# Patient Record
Sex: MALE | Race: WHITE | Employment: FULL TIME | URBAN - METROPOLITAN AREA
[De-identification: names, ages, dates, MRNs, and addresses within clinical notes are randomized per-mention and may not be internally consistent; named-entity substitution may affect disease eponyms.]

---

## 2018-11-17 ENCOUNTER — HOSPITAL ENCOUNTER (INPATIENT)
Age: 28
LOS: 2 days | Discharge: HOME | DRG: 369 | End: 2018-11-19
Attending: STUDENT IN AN ORGANIZED HEALTH CARE EDUCATION/TRAINING PROGRAM | Admitting: INTERNAL MEDICINE
Payer: COMMERCIAL

## 2018-11-17 ENCOUNTER — APPOINTMENT (OUTPATIENT)
Dept: CT IMAGING | Age: 28
DRG: 369 | End: 2018-11-17
Attending: EMERGENCY MEDICINE
Payer: COMMERCIAL

## 2018-11-17 DIAGNOSIS — K92.0 GASTROINTESTINAL HEMORRHAGE WITH HEMATEMESIS: Primary | ICD-10-CM

## 2018-11-17 PROBLEM — D72.829 LEUKOCYTOSIS: Status: ACTIVE | Noted: 2018-11-17

## 2018-11-17 PROBLEM — F10.10 ETOH ABUSE: Status: ACTIVE | Noted: 2018-11-17

## 2018-11-17 PROBLEM — K92.2 ACUTE GI BLEEDING: Status: ACTIVE | Noted: 2018-11-17

## 2018-11-17 PROBLEM — E87.20 METABOLIC ACIDOSIS: Status: ACTIVE | Noted: 2018-11-17

## 2018-11-17 LAB
ABO + RH BLD: NORMAL
ALBUMIN SERPL-MCNC: 3.8 G/DL (ref 3.5–5)
ALBUMIN/GLOB SERPL: 1.1 {RATIO} (ref 1.1–2.2)
ALP SERPL-CCNC: 51 U/L (ref 45–117)
ALT SERPL-CCNC: 36 U/L (ref 12–78)
ANION GAP SERPL CALC-SCNC: 11 MMOL/L (ref 5–15)
AST SERPL-CCNC: 21 U/L (ref 15–37)
BASOPHILS # BLD: 0.1 K/UL (ref 0–0.1)
BASOPHILS NFR BLD: 0 % (ref 0–1)
BILIRUB SERPL-MCNC: 0.4 MG/DL (ref 0.2–1)
BLOOD GROUP ANTIBODIES SERPL: NORMAL
BUN SERPL-MCNC: 39 MG/DL (ref 6–20)
BUN/CREAT SERPL: 38 (ref 12–20)
CALCIUM SERPL-MCNC: 8.9 MG/DL (ref 8.5–10.1)
CHLORIDE SERPL-SCNC: 101 MMOL/L (ref 97–108)
CO2 SERPL-SCNC: 28 MMOL/L (ref 21–32)
CREAT SERPL-MCNC: 1.03 MG/DL (ref 0.7–1.3)
DIFFERENTIAL METHOD BLD: ABNORMAL
EOSINOPHIL # BLD: 0.2 K/UL (ref 0–0.4)
EOSINOPHIL NFR BLD: 1 % (ref 0–7)
ERYTHROCYTE [DISTWIDTH] IN BLOOD BY AUTOMATED COUNT: 12.5 % (ref 11.5–14.5)
ERYTHROCYTE [DISTWIDTH] IN BLOOD BY AUTOMATED COUNT: 12.7 % (ref 11.5–14.5)
ETHANOL SERPL-MCNC: <10 MG/DL
GLOBULIN SER CALC-MCNC: 3.6 G/DL (ref 2–4)
GLUCOSE SERPL-MCNC: 121 MG/DL (ref 65–100)
HCT VFR BLD AUTO: 36.7 % (ref 36.6–50.3)
HCT VFR BLD AUTO: 41.4 % (ref 36.6–50.3)
HGB BLD-MCNC: 12.1 G/DL (ref 12.1–17)
HGB BLD-MCNC: 13.9 G/DL (ref 12.1–17)
IMM GRANULOCYTES # BLD: 0.2 K/UL (ref 0–0.04)
IMM GRANULOCYTES NFR BLD AUTO: 1 % (ref 0–0.5)
INR PPP: 1.1 (ref 0.9–1.1)
LACTATE SERPL-SCNC: 2.2 MMOL/L (ref 0.4–2)
LIPASE SERPL-CCNC: 84 U/L (ref 73–393)
LYMPHOCYTES # BLD: 2.9 K/UL (ref 0.8–3.5)
LYMPHOCYTES NFR BLD: 16 % (ref 12–49)
MAGNESIUM SERPL-MCNC: 1.8 MG/DL (ref 1.6–2.4)
MCH RBC QN AUTO: 28.8 PG (ref 26–34)
MCH RBC QN AUTO: 29 PG (ref 26–34)
MCHC RBC AUTO-ENTMCNC: 33 G/DL (ref 30–36.5)
MCHC RBC AUTO-ENTMCNC: 33.6 G/DL (ref 30–36.5)
MCV RBC AUTO: 86.4 FL (ref 80–99)
MCV RBC AUTO: 87.4 FL (ref 80–99)
MONOCYTES # BLD: 1.3 K/UL (ref 0–1)
MONOCYTES NFR BLD: 7 % (ref 5–13)
NEUTS SEG # BLD: 14 K/UL (ref 1.8–8)
NEUTS SEG NFR BLD: 75 % (ref 32–75)
NRBC # BLD: 0 K/UL (ref 0–0.01)
NRBC # BLD: 0 K/UL (ref 0–0.01)
NRBC BLD-RTO: 0 PER 100 WBC
NRBC BLD-RTO: 0 PER 100 WBC
PLATELET # BLD AUTO: 241 K/UL (ref 150–400)
PLATELET # BLD AUTO: 275 K/UL (ref 150–400)
PMV BLD AUTO: 10.7 FL (ref 8.9–12.9)
PMV BLD AUTO: 10.7 FL (ref 8.9–12.9)
POTASSIUM SERPL-SCNC: 3.9 MMOL/L (ref 3.5–5.1)
PROT SERPL-MCNC: 7.4 G/DL (ref 6.4–8.2)
PROTHROMBIN TIME: 10.9 SEC (ref 9–11.1)
RBC # BLD AUTO: 4.2 M/UL (ref 4.1–5.7)
RBC # BLD AUTO: 4.79 M/UL (ref 4.1–5.7)
SODIUM SERPL-SCNC: 140 MMOL/L (ref 136–145)
SPECIMEN EXP DATE BLD: NORMAL
WBC # BLD AUTO: 18.7 K/UL (ref 4.1–11.1)
WBC # BLD AUTO: 20 K/UL (ref 4.1–11.1)

## 2018-11-17 PROCEDURE — 85025 COMPLETE CBC W/AUTO DIFF WBC: CPT

## 2018-11-17 PROCEDURE — 74011000258 HC RX REV CODE- 258: Performed by: INTERNAL MEDICINE

## 2018-11-17 PROCEDURE — 85610 PROTHROMBIN TIME: CPT

## 2018-11-17 PROCEDURE — 74011250636 HC RX REV CODE- 250/636: Performed by: EMERGENCY MEDICINE

## 2018-11-17 PROCEDURE — C9113 INJ PANTOPRAZOLE SODIUM, VIA: HCPCS | Performed by: INTERNAL MEDICINE

## 2018-11-17 PROCEDURE — 96374 THER/PROPH/DIAG INJ IV PUSH: CPT

## 2018-11-17 PROCEDURE — 74177 CT ABD & PELVIS W/CONTRAST: CPT

## 2018-11-17 PROCEDURE — 65270000029 HC RM PRIVATE

## 2018-11-17 PROCEDURE — 93005 ELECTROCARDIOGRAM TRACING: CPT

## 2018-11-17 PROCEDURE — 85027 COMPLETE CBC AUTOMATED: CPT

## 2018-11-17 PROCEDURE — 83735 ASSAY OF MAGNESIUM: CPT

## 2018-11-17 PROCEDURE — 36415 COLL VENOUS BLD VENIPUNCTURE: CPT

## 2018-11-17 PROCEDURE — 99284 EMERGENCY DEPT VISIT MOD MDM: CPT

## 2018-11-17 PROCEDURE — 83605 ASSAY OF LACTIC ACID: CPT

## 2018-11-17 PROCEDURE — 80053 COMPREHEN METABOLIC PANEL: CPT

## 2018-11-17 PROCEDURE — 74011636320 HC RX REV CODE- 636/320: Performed by: RADIOLOGY

## 2018-11-17 PROCEDURE — 83690 ASSAY OF LIPASE: CPT

## 2018-11-17 PROCEDURE — 96375 TX/PRO/DX INJ NEW DRUG ADDON: CPT

## 2018-11-17 PROCEDURE — C9113 INJ PANTOPRAZOLE SODIUM, VIA: HCPCS | Performed by: EMERGENCY MEDICINE

## 2018-11-17 PROCEDURE — 94762 N-INVAS EAR/PLS OXIMTRY CONT: CPT

## 2018-11-17 PROCEDURE — 74011250636 HC RX REV CODE- 250/636: Performed by: INTERNAL MEDICINE

## 2018-11-17 PROCEDURE — 86901 BLOOD TYPING SEROLOGIC RH(D): CPT

## 2018-11-17 PROCEDURE — 80307 DRUG TEST PRSMV CHEM ANLYZR: CPT

## 2018-11-17 PROCEDURE — 74011000250 HC RX REV CODE- 250: Performed by: INTERNAL MEDICINE

## 2018-11-17 PROCEDURE — 96361 HYDRATE IV INFUSION ADD-ON: CPT

## 2018-11-17 RX ORDER — SODIUM CHLORIDE 0.9 % (FLUSH) 0.9 %
5-10 SYRINGE (ML) INJECTION AS NEEDED
Status: DISCONTINUED | OUTPATIENT
Start: 2018-11-17 | End: 2018-11-19 | Stop reason: HOSPADM

## 2018-11-17 RX ORDER — ACETAMINOPHEN 325 MG/1
650 TABLET ORAL
Status: DISCONTINUED | OUTPATIENT
Start: 2018-11-17 | End: 2018-11-19 | Stop reason: HOSPADM

## 2018-11-17 RX ORDER — SODIUM CHLORIDE 0.9 % (FLUSH) 0.9 %
5-10 SYRINGE (ML) INJECTION EVERY 8 HOURS
Status: DISCONTINUED | OUTPATIENT
Start: 2018-11-17 | End: 2018-11-19 | Stop reason: HOSPADM

## 2018-11-17 RX ORDER — ONDANSETRON 2 MG/ML
4 INJECTION INTRAMUSCULAR; INTRAVENOUS
Status: DISCONTINUED | OUTPATIENT
Start: 2018-11-17 | End: 2018-11-19 | Stop reason: HOSPADM

## 2018-11-17 RX ORDER — HYDROMORPHONE HYDROCHLORIDE 2 MG/ML
0.5 INJECTION, SOLUTION INTRAMUSCULAR; INTRAVENOUS; SUBCUTANEOUS
Status: DISCONTINUED | OUTPATIENT
Start: 2018-11-17 | End: 2018-11-18

## 2018-11-17 RX ORDER — NALOXONE HYDROCHLORIDE 0.4 MG/ML
0.4 INJECTION, SOLUTION INTRAMUSCULAR; INTRAVENOUS; SUBCUTANEOUS AS NEEDED
Status: DISCONTINUED | OUTPATIENT
Start: 2018-11-17 | End: 2018-11-19 | Stop reason: HOSPADM

## 2018-11-17 RX ORDER — ONDANSETRON 2 MG/ML
4 INJECTION INTRAMUSCULAR; INTRAVENOUS
Status: COMPLETED | OUTPATIENT
Start: 2018-11-17 | End: 2018-11-17

## 2018-11-17 RX ORDER — PANTOPRAZOLE SODIUM 40 MG/10ML
40 INJECTION, POWDER, LYOPHILIZED, FOR SOLUTION INTRAVENOUS
Status: COMPLETED | OUTPATIENT
Start: 2018-11-17 | End: 2018-11-17

## 2018-11-17 RX ORDER — DIPHENHYDRAMINE HYDROCHLORIDE 50 MG/ML
12.5 INJECTION, SOLUTION INTRAMUSCULAR; INTRAVENOUS
Status: DISCONTINUED | OUTPATIENT
Start: 2018-11-17 | End: 2018-11-19 | Stop reason: HOSPADM

## 2018-11-17 RX ORDER — SODIUM CHLORIDE 9 MG/ML
100 INJECTION, SOLUTION INTRAVENOUS CONTINUOUS
Status: DISCONTINUED | OUTPATIENT
Start: 2018-11-17 | End: 2018-11-19 | Stop reason: HOSPADM

## 2018-11-17 RX ORDER — DOCUSATE SODIUM 100 MG/1
100 CAPSULE, LIQUID FILLED ORAL 2 TIMES DAILY
Status: DISCONTINUED | OUTPATIENT
Start: 2018-11-17 | End: 2018-11-19 | Stop reason: HOSPADM

## 2018-11-17 RX ORDER — LORAZEPAM 2 MG/ML
2 INJECTION INTRAMUSCULAR
Status: DISCONTINUED | OUTPATIENT
Start: 2018-11-17 | End: 2018-11-19 | Stop reason: HOSPADM

## 2018-11-17 RX ORDER — LORAZEPAM 2 MG/ML
4 INJECTION INTRAMUSCULAR
Status: DISCONTINUED | OUTPATIENT
Start: 2018-11-17 | End: 2018-11-19 | Stop reason: HOSPADM

## 2018-11-17 RX ADMIN — ONDANSETRON 4 MG: 2 INJECTION INTRAMUSCULAR; INTRAVENOUS at 23:07

## 2018-11-17 RX ADMIN — IOPAMIDOL 100 ML: 755 INJECTION, SOLUTION INTRAVENOUS at 18:44

## 2018-11-17 RX ADMIN — ONDANSETRON 4 MG: 2 INJECTION INTRAMUSCULAR; INTRAVENOUS at 18:02

## 2018-11-17 RX ADMIN — FOLIC ACID: 5 INJECTION, SOLUTION INTRAMUSCULAR; INTRAVENOUS; SUBCUTANEOUS at 19:58

## 2018-11-17 RX ADMIN — Medication 10 ML: at 22:00

## 2018-11-17 RX ADMIN — PANTOPRAZOLE SODIUM 40 MG: 40 INJECTION, POWDER, FOR SOLUTION INTRAVENOUS at 18:02

## 2018-11-17 RX ADMIN — SODIUM CHLORIDE 8 MG/HR: 900 INJECTION, SOLUTION INTRAVENOUS at 23:08

## 2018-11-17 RX ADMIN — SODIUM CHLORIDE 8 MG/HR: 900 INJECTION, SOLUTION INTRAVENOUS at 19:25

## 2018-11-17 RX ADMIN — SODIUM CHLORIDE 1000 ML: 900 INJECTION, SOLUTION INTRAVENOUS at 18:02

## 2018-11-17 NOTE — ED PROVIDER NOTES
I have evaluated the patient as the Provider in Triage. I have reviewed His vital signs and the triage nurse assessment. I have talked with the patient and any available family and advised that I am the provider in triage and have ordered the appropriate study to initiate their work up based on the clinical presentation during my assessment. I have advised that the patient will be accommodated in the Main ED as soon as possible. I have also requested to contact the triage nurse or myself immediately if the patient experiences any changes in their condition during this brief waiting period. 34 y.o. male presents with stefanie hematemesis, mixed with coffee grounds, with BRBPR. Started this AM. Heavy ETOH use last night. No hx of prior GI bleeds, or ulcers. Had syncope PTA.  
5:31 PM 
Bianka Nagel,  
 
 
34 y.o. male with no significant past medical history who presents from home with chief complaint of bloody stool. Pt reports he initially vomited 13-14 hours ago which contained a \"scant\" amount of blood. Per Pt's mother, Pt has had several BM's which have been \"very bloody\" and are very dark in color. He also c/o 6/10 epigastric abdominal pain through the day today. Per mother, Pt became confused and had a syncopal episode after passing his last BM PTA. Pt notes he has never had similar sx nor been evaluated by GI in the past. Pt's mother states Pt appears pale. Pt denies known medical problems or regular medications. Pt denies prior hx of blood transfusion. NKDA. There are no other acute medical concerns at this time. Social hx: smokes 1/4 - 1 pack per day, drinks 6-8 beers per day, uses marijuana Note written by Yoel Palomino, as dictated by James Nuñez MD 5:59 PM 
 
 
 
  
 
 
No past medical history on file. No past surgical history on file. No family history on file. Social History Socioeconomic History  Marital status:  Spouse name: Not on file  Number of children: Not on file  Years of education: Not on file  Highest education level: Not on file Social Needs  Financial resource strain: Not on file  Food insecurity - worry: Not on file  Food insecurity - inability: Not on file  Transportation needs - medical: Not on file  Transportation needs - non-medical: Not on file Occupational History  Not on file Tobacco Use  Smoking status: Never Smoker Substance and Sexual Activity  Alcohol use: No  
 Drug use: No  
 Sexual activity: Not Currently Other Topics Concern  Not on file Social History Narrative  Not on file ALLERGIES: Patient has no known allergies. Review of Systems Constitutional: Negative for chills, diaphoresis, fatigue and fever. HENT: Negative for congestion, rhinorrhea, sinus pressure, sore throat, trouble swallowing and voice change. Eyes: Negative for photophobia and visual disturbance. Respiratory: Negative for cough, chest tightness and shortness of breath. Cardiovascular: Negative for chest pain, palpitations and leg swelling. Gastrointestinal: Positive for abdominal pain, blood in stool, nausea and vomiting. Negative for constipation and diarrhea. Musculoskeletal: Negative for arthralgias, myalgias and neck pain. Neurological: Positive for syncope. Negative for dizziness, weakness, light-headedness, numbness and headaches. All other systems reviewed and are negative. There were no vitals filed for this visit. Physical Exam  
Constitutional: He is oriented to person, place, and time. He appears well-developed and well-nourished. No distress. HENT:  
Head: Normocephalic and atraumatic. Nose: Nose normal.  
Mouth/Throat: Oropharynx is clear and moist. No oropharyngeal exudate. Eyes: Conjunctivae and EOM are normal. Right eye exhibits no discharge. Left eye exhibits no discharge. No scleral icterus. Neck: Normal range of motion. Neck supple. No JVD present. No tracheal deviation present. No thyromegaly present. Cardiovascular: Normal rate, regular rhythm, normal heart sounds and intact distal pulses. Exam reveals no gallop and no friction rub. No murmur heard. Pulmonary/Chest: Effort normal and breath sounds normal. No stridor. No respiratory distress. He has no wheezes. He has no rales. He exhibits no tenderness. Abdominal: Bowel sounds are normal. He exhibits no distension and no mass. There is tenderness. There is no rebound. Mild epigastric tenderness. Musculoskeletal: Normal range of motion. He exhibits no edema or tenderness. Lymphadenopathy:  
  He has no cervical adenopathy. Neurological: He is alert and oriented to person, place, and time. No cranial nerve deficit. Coordination normal.  
Skin: Skin is warm and dry. No rash noted. He is not diaphoretic. No erythema. There is pallor. Psychiatric: He has a normal mood and affect. His behavior is normal. Judgment and thought content normal.  
Nursing note and vitals reviewed. Note written by Yoel Liao, as dictated by Nato Griffin MD 5:59 PM 
 
MDM Number of Diagnoses or Management Options Gastrointestinal hemorrhage with hematemesis:  
Diagnosis management comments: A/P:  GIB likely 2/2 ETOH use/abuse. Yolanda-Rich tear also on differential.  Pt currently stable. Cbc, cmp, lactate, type and screen, protonix 40 mg IV, CT abd/pelvis. Reassessment:  Pt to be admitted discussed with GI and Hopsitalist 
 
  
Amount and/or Complexity of Data Reviewed Clinical lab tests: ordered and reviewed Tests in the radiology section of CPT®: reviewed and ordered Review and summarize past medical records: yes Discuss the patient with other providers: yes Independent visualization of images, tracings, or specimens: yes Risk of Complications, Morbidity, and/or Mortality Presenting problems: high Diagnostic procedures: moderate Management options: moderate Critical Care Total time providing critical care: 30-74 minutes (Total critical care time spent exclusive of procedures:  35 min.) Patient Progress Patient progress: stable Procedures CONSULT NOTE: 
7:07 PM Jonathan Tolbert MD spoke with Dr. Jaylen Jonas, Consult for Hospitalist.  Discussed available diagnostic tests and clinical findings. Dr. Jaylen Jonas will admit Pt. CONSULT NOTE: 
7:12 PM Jonathan Tolbert MD spoke with Dr. Rob Nguyen, Consult for Gastroenterology. Discussed available diagnostic tests and clinical findings. Dr. Rob Nguyen recommends admitting Pt and keeping him NPO. 
 
11:20 AM 
Patient is being admitted to the hospital.  The results of their tests and reasons for their admission have been discussed with them and/or available family. They convey agreement and understanding for the need to be admitted and for their admission diagnosis. Consultation has been made with the inpatient physician specialist for hospitalization. LABORATORY TESTS: 
Recent Results (from the past 12 hour(s)) LACTIC ACID Collection Time: 11/18/18  3:53 AM  
Result Value Ref Range Lactic acid 0.8 0.4 - 2.0 MMOL/L  
METABOLIC PANEL, BASIC Collection Time: 11/18/18  3:53 AM  
Result Value Ref Range Sodium 142 136 - 145 mmol/L Potassium 3.7 3.5 - 5.1 mmol/L Chloride 105 97 - 108 mmol/L  
 CO2 30 21 - 32 mmol/L Anion gap 7 5 - 15 mmol/L Glucose 118 (H) 65 - 100 mg/dL BUN 34 (H) 6 - 20 MG/DL Creatinine 0.85 0.70 - 1.30 MG/DL  
 BUN/Creatinine ratio 40 (H) 12 - 20 GFR est AA >60 >60 ml/min/1.73m2 GFR est non-AA >60 >60 ml/min/1.73m2 Calcium 8.1 (L) 8.5 - 10.1 MG/DL  
CBC W/O DIFF Collection Time: 11/18/18  3:53 AM  
Result Value Ref Range WBC 12.6 (H) 4.1 - 11.1 K/uL  
 RBC 3.87 (L) 4.10 - 5.70 M/uL  
 HGB 11.2 (L) 12.1 - 17.0 g/dL HCT 33.5 (L) 36.6 - 50.3 %  MCV 86.6 80.0 - 99.0 FL  
 MCH 28.9 26.0 - 34.0 PG  
 MCHC 33.4 30.0 - 36.5 g/dL  
 RDW 12.7 11.5 - 14.5 % PLATELET 723 642 - 765 K/uL MPV 10.7 8.9 - 12.9 FL  
 NRBC 0.0 0  WBC ABSOLUTE NRBC 0.00 0.00 - 0.01 K/uL HEPATIC FUNCTION PANEL Collection Time: 11/18/18  3:53 AM  
Result Value Ref Range Protein, total 6.4 6.4 - 8.2 g/dL Albumin 3.3 (L) 3.5 - 5.0 g/dL Globulin 3.1 2.0 - 4.0 g/dL A-G Ratio 1.1 1.1 - 2.2 Bilirubin, total 0.4 0.2 - 1.0 MG/DL Bilirubin, direct 0.1 0.0 - 0.2 MG/DL Alk. phosphatase 44 (L) 45 - 117 U/L  
 AST (SGOT) 16 15 - 37 U/L  
 ALT (SGPT) 30 12 - 78 U/L  
MAGNESIUM Collection Time: 11/18/18  3:53 AM  
Result Value Ref Range Magnesium 1.9 1.6 - 2.4 mg/dL PHOSPHORUS Collection Time: 11/18/18  3:53 AM  
Result Value Ref Range Phosphorus 3.3 2.6 - 4.7 MG/DL IMAGING RESULTS: 
CT ABD PELV W CONT Final Result Ct Abd Pelv W Cont Result Date: 11/17/2018 EXAM:  CT ABDOMEN PELVIS WITH CONTRAST INDICATION:  epigastric abd pain, hematemesis. Additional history: COMPARISON: None. . TECHNIQUE: Multislice helical CT was performed from the diaphragm to the symphysis pubis with oral and intravenous contrast administration. Contiguous 5 mm axial images were reconstructed and lung and soft tissue windows were generated. Coronal and sagittal reformations were generated. CT dose reduction was achieved through use of a standardized protocol tailored for this examination and automatic exposure control for dose modulation. Dejan Velez FINDINGS: INCIDENTALLY IMAGED CHEST: Heart/vessels: Within normal limits. Lungs/Pleura: Within normal limits. . ABDOMEN: Liver: Within normal limits. Gallbladder/Biliary: Within normal limits. Spleen: Within normal limits. Pancreas: Within normal limits. Adrenals: Within normal limits. Kidneys: Within normal limits. Peritoneum/Mesenteries: Within normal limits. Extraperitoneum: Within normal limits.  Gastrointestinal tract: Small hiatal hernia with minimal mural thickening in the distal esophagus. Normal-appearing appendix Vascular: Within normal limits. [facility] PELVIS: Extraperitoneum: Within normal limits. Ureters: Within normal limits. Bladder: Within normal limits. Reproductive System: Within normal limits. . MSK: Within normal limits. [facility] IMPRESSION: 1. Small hiatal hernia with mural thickening in the distal esophagus. Recommend clinical correlation. MEDICATIONS GIVEN: 
Medications  
pantoprazole (PROTONIX) 40 mg in 0.9% sodium chloride (MBP/ADV) 50 mL (8 mg/hr IntraVENous New Bag 11/18/18 0945) LORazepam (ATIVAN) injection 2 mg (not administered) LORazepam (ATIVAN) injection 4 mg (not administered) dextrose 5 % - 0.45% NaCl 8,938 mL with folic acid 1 mg, thiamine 100 mg, mvi, adult no. 4 with vit K 10 mL infusion ( IntraVENous Given 11/17/18 1958)  
0.9% sodium chloride infusion (50 mL/hr IntraVENous New Bag 11/18/18 0358)  
sodium chloride (NS) flush 5-10 mL (10 mL IntraVENous Given 11/17/18 2200)  
sodium chloride (NS) flush 5-10 mL (not administered)  
acetaminophen (TYLENOL) tablet 650 mg (not administered)  
naloxone (NARCAN) injection 0.4 mg (not administered) diphenhydrAMINE (BENADRYL) injection 12.5 mg (not administered)  
ondansetron (ZOFRAN) injection 4 mg (4 mg IntraVENous Given 11/17/18 2307) docusate sodium (COLACE) capsule 100 mg (0 mg Oral Held 11/18/18 0900) ondansetron [facility]) injection 4 mg (4 mg IntraVENous Given 11/17/18 1802) pantoprazole (PROTONIX) injection 40 mg (40 mg IntraVENous Given 11/17/18 1802) sodium chloride 0.9 % bolus infusion 1,000 mL (0 mL IntraVENous IV Completed 11/17/18 1924) iopamidol (ISOVUE-370) 76 % injection 100 mL (100 mL IntraVENous Given 11/17/18 1844) IMPRESSION: 
1. Gastrointestinal hemorrhage with hematemesis PLAN: 
1. Admit to Hospitalist 
 
Total critical care time spent exclusive of procedures:  35 minutes Xi Romano MD

## 2018-11-17 NOTE — ED TRIAGE NOTES
Vomited twice started 04:30 am and then went to work vomited again this evening. Passing blood in stools. +ETOH abuse.

## 2018-11-18 LAB
ALBUMIN SERPL-MCNC: 3.3 G/DL (ref 3.5–5)
ALBUMIN/GLOB SERPL: 1.1 {RATIO} (ref 1.1–2.2)
ALP SERPL-CCNC: 44 U/L (ref 45–117)
ALT SERPL-CCNC: 30 U/L (ref 12–78)
ANION GAP SERPL CALC-SCNC: 7 MMOL/L (ref 5–15)
AST SERPL-CCNC: 16 U/L (ref 15–37)
ATRIAL RATE: 90 BPM
BILIRUB DIRECT SERPL-MCNC: 0.1 MG/DL (ref 0–0.2)
BILIRUB SERPL-MCNC: 0.4 MG/DL (ref 0.2–1)
BUN SERPL-MCNC: 34 MG/DL (ref 6–20)
BUN/CREAT SERPL: 40 (ref 12–20)
CALCIUM SERPL-MCNC: 8.1 MG/DL (ref 8.5–10.1)
CALCULATED P AXIS, ECG09: 63 DEGREES
CALCULATED R AXIS, ECG10: 2 DEGREES
CALCULATED T AXIS, ECG11: 5 DEGREES
CHLORIDE SERPL-SCNC: 105 MMOL/L (ref 97–108)
CO2 SERPL-SCNC: 30 MMOL/L (ref 21–32)
CREAT SERPL-MCNC: 0.85 MG/DL (ref 0.7–1.3)
DIAGNOSIS, 93000: NORMAL
ERYTHROCYTE [DISTWIDTH] IN BLOOD BY AUTOMATED COUNT: 12.7 % (ref 11.5–14.5)
GLOBULIN SER CALC-MCNC: 3.1 G/DL (ref 2–4)
GLUCOSE SERPL-MCNC: 118 MG/DL (ref 65–100)
HCT VFR BLD AUTO: 31.1 % (ref 36.6–50.3)
HCT VFR BLD AUTO: 33.5 % (ref 36.6–50.3)
HCT VFR BLD AUTO: 33.5 % (ref 36.6–50.3)
HGB BLD-MCNC: 10.5 G/DL (ref 12.1–17)
HGB BLD-MCNC: 11.2 G/DL (ref 12.1–17)
HGB BLD-MCNC: 11.3 G/DL (ref 12.1–17)
LACTATE SERPL-SCNC: 0.8 MMOL/L (ref 0.4–2)
MAGNESIUM SERPL-MCNC: 1.9 MG/DL (ref 1.6–2.4)
MCH RBC QN AUTO: 28.9 PG (ref 26–34)
MCHC RBC AUTO-ENTMCNC: 33.4 G/DL (ref 30–36.5)
MCV RBC AUTO: 86.6 FL (ref 80–99)
NRBC # BLD: 0 K/UL (ref 0–0.01)
NRBC BLD-RTO: 0 PER 100 WBC
P-R INTERVAL, ECG05: 146 MS
PHOSPHATE SERPL-MCNC: 3.3 MG/DL (ref 2.6–4.7)
PLATELET # BLD AUTO: 213 K/UL (ref 150–400)
PMV BLD AUTO: 10.7 FL (ref 8.9–12.9)
POTASSIUM SERPL-SCNC: 3.7 MMOL/L (ref 3.5–5.1)
PROT SERPL-MCNC: 6.4 G/DL (ref 6.4–8.2)
Q-T INTERVAL, ECG07: 344 MS
QRS DURATION, ECG06: 84 MS
QTC CALCULATION (BEZET), ECG08: 420 MS
RBC # BLD AUTO: 3.87 M/UL (ref 4.1–5.7)
SODIUM SERPL-SCNC: 142 MMOL/L (ref 136–145)
VENTRICULAR RATE, ECG03: 90 BPM
WBC # BLD AUTO: 12.6 K/UL (ref 4.1–11.1)

## 2018-11-18 PROCEDURE — C9113 INJ PANTOPRAZOLE SODIUM, VIA: HCPCS | Performed by: INTERNAL MEDICINE

## 2018-11-18 PROCEDURE — 65660000000 HC RM CCU STEPDOWN

## 2018-11-18 PROCEDURE — 74011000258 HC RX REV CODE- 258: Performed by: INTERNAL MEDICINE

## 2018-11-18 PROCEDURE — 83605 ASSAY OF LACTIC ACID: CPT

## 2018-11-18 PROCEDURE — 85027 COMPLETE CBC AUTOMATED: CPT

## 2018-11-18 PROCEDURE — 36415 COLL VENOUS BLD VENIPUNCTURE: CPT

## 2018-11-18 PROCEDURE — 80048 BASIC METABOLIC PNL TOTAL CA: CPT

## 2018-11-18 PROCEDURE — 80076 HEPATIC FUNCTION PANEL: CPT

## 2018-11-18 PROCEDURE — 83735 ASSAY OF MAGNESIUM: CPT

## 2018-11-18 PROCEDURE — 84100 ASSAY OF PHOSPHORUS: CPT

## 2018-11-18 PROCEDURE — 74011000250 HC RX REV CODE- 250: Performed by: INTERNAL MEDICINE

## 2018-11-18 PROCEDURE — 74011250636 HC RX REV CODE- 250/636: Performed by: INTERNAL MEDICINE

## 2018-11-18 PROCEDURE — 85014 HEMATOCRIT: CPT

## 2018-11-18 RX ADMIN — Medication 10 ML: at 20:01

## 2018-11-18 RX ADMIN — Medication 10 ML: at 11:31

## 2018-11-18 RX ADMIN — SODIUM CHLORIDE 100 ML/HR: 900 INJECTION, SOLUTION INTRAVENOUS at 16:29

## 2018-11-18 RX ADMIN — SODIUM CHLORIDE 8 MG/HR: 900 INJECTION, SOLUTION INTRAVENOUS at 16:29

## 2018-11-18 RX ADMIN — SODIUM CHLORIDE 8 MG/HR: 900 INJECTION, SOLUTION INTRAVENOUS at 22:29

## 2018-11-18 RX ADMIN — FOLIC ACID: 5 INJECTION, SOLUTION INTRAMUSCULAR; INTRAVENOUS; SUBCUTANEOUS at 17:52

## 2018-11-18 RX ADMIN — Medication 10 ML: at 14:55

## 2018-11-18 RX ADMIN — SODIUM CHLORIDE 8 MG/HR: 900 INJECTION, SOLUTION INTRAVENOUS at 04:00

## 2018-11-18 RX ADMIN — SODIUM CHLORIDE 8 MG/HR: 900 INJECTION, SOLUTION INTRAVENOUS at 09:45

## 2018-11-18 RX ADMIN — SODIUM CHLORIDE 50 ML/HR: 900 INJECTION, SOLUTION INTRAVENOUS at 03:58

## 2018-11-18 NOTE — ED NOTES
Pt transferred to hospital bed in room 17. Visitor at bedside. Pt remains with seizure precautions in place. No new complaints at this time.

## 2018-11-18 NOTE — CONSULTS
PAT Lebron MD  [phone] office     Gastroenterology Consultation Note      Admit Date: 11/17/2018  Consult Date: 11/18/2018   I greatly appreciate your asking me to see John Keys, thank you very much for the opportunity to participate in his care. Narrative Assessment and Plan   · 34year old male with history of significant alcohol use who presented with hematemesis and melena. He has remained stable hemodynamically. His CT on admission showed no evidence of chronic liver disease. He likely has esophagitis or a Yolanda-Rich tear, based on his history. Would continue IV PPI therapy. Full liquid diet today. We will proceed with an EGD tomorrow for further evaluation. Subjective:     Chief Complaint: Hemetemesis    History of Present Illness:    Mr. Yvette Jo is a 34year old male with a history of significant alcohol use who presented with the acute onset of hematemesis and melena. He began vomiting last evening, and then he noted blood after he had already vomited a few times. Currently he is resting comfortably. He has had no further melena or vomiting. He is hungry and wants to eat. None    Past Medical History:   Diagnosis Date    Alcohol abuse         No past surgical history on file.     Social History     Tobacco Use    Smoking status: Never Smoker   Substance Use Topics    Alcohol use: Yes     Drinks per session: 10 or more     Binge frequency: Daily or almost daily        Family History   Problem Relation Age of Onset    Depression Father     Alcohol abuse Father         No Known Allergies         Home Medications:  None       Hospital Medications:  Current Facility-Administered Medications   Medication Dose Route Frequency    pantoprazole (PROTONIX) 40 mg in 0.9% sodium chloride (MBP/ADV) 50 mL  8 mg/hr IntraVENous CONTINUOUS    LORazepam (ATIVAN) injection 2 mg  2 mg IntraVENous Q1H PRN    LORazepam (ATIVAN) injection 4 mg  4 mg IntraVENous Q1H PRN    dextrose 5 % - 0.45% NaCl 4,462 mL with folic acid 1 mg, thiamine 100 mg, mvi, adult no. 4 with vit K 10 mL infusion   IntraVENous DAILY    0.9% sodium chloride infusion  100 mL/hr IntraVENous CONTINUOUS    sodium chloride (NS) flush 5-10 mL  5-10 mL IntraVENous Q8H    sodium chloride (NS) flush 5-10 mL  5-10 mL IntraVENous PRN    acetaminophen (TYLENOL) tablet 650 mg  650 mg Oral Q4H PRN    naloxone (NARCAN) injection 0.4 mg  0.4 mg IntraVENous PRN    diphenhydrAMINE (BENADRYL) injection 12.5 mg  12.5 mg IntraVENous Q4H PRN    ondansetron (ZOFRAN) injection 4 mg  4 mg IntraVENous Q6H PRN    docusate sodium (COLACE) capsule 100 mg  100 mg Oral BID       Review of Systems: Full ROS was done and was negative except as noted in the HPI. Objective:     Physical Exam:  Visit Vitals  /83 (BP 1 Location: Right arm, BP Patient Position: At rest)   Pulse 82   Temp 98.6 °F (37 °C)   Resp 18   Ht 5' 10\" (1.778 m)   Wt 91.6 kg (202 lb)   SpO2 97%   BMI 28.98 kg/m²     SpO2 Readings from Last 6 Encounters:   11/18/18 97%   11/24/14 97%            Intake/Output Summary (Last 24 hours) at 11/18/2018 1307  Last data filed at 11/17/2018 2015  Gross per 24 hour   Intake    Output 1 ml   Net -1 ml      General: no distress, comfortable  Skin:  No rash or jaundice  HEENT: Pupils equal, sclera anicteric  Cardiovascular: Regular, well perfused, no edema  Respiratory:  Clear bilaterally, normal respiratory effort, no throacic deformity  GI:  Abdomen soft, nondistended, nontender, no rebound or guarding. Musculoskeletal:  No skeletal deformity nor acute arthritis noted.   Neurological:  Motor and sensory function intact in upper extremities  Psychiatric:  Normal affect, memory intact, appears to have insight into current illness    Laboratory:    Recent Results (from the past 24 hour(s))   EKG, 12 LEAD, INITIAL    Collection Time: 11/17/18  5:41 PM   Result Value Ref Range    Ventricular Rate 90 BPM    Atrial Rate 90 BPM    P-R Interval 146 ms    QRS Duration 84 ms    Q-T Interval 344 ms    QTC Calculation (Bezet) 420 ms    Calculated P Axis 63 degrees    Calculated R Axis 2 degrees    Calculated T Axis 5 degrees    Diagnosis       Normal sinus rhythm with sinus arrhythmia  Normal ECG  No previous ECGs available     CBC WITH AUTOMATED DIFF    Collection Time: 11/17/18  5:54 PM   Result Value Ref Range    WBC 18.7 (H) 4.1 - 11.1 K/uL    RBC 4.79 4.10 - 5.70 M/uL    HGB 13.9 12.1 - 17.0 g/dL    HCT 41.4 36.6 - 50.3 %    MCV 86.4 80.0 - 99.0 FL    MCH 29.0 26.0 - 34.0 PG    MCHC 33.6 30.0 - 36.5 g/dL    RDW 12.5 11.5 - 14.5 %    PLATELET 387 291 - 619 K/uL    MPV 10.7 8.9 - 12.9 FL    NRBC 0.0 0  WBC    ABSOLUTE NRBC 0.00 0.00 - 0.01 K/uL    NEUTROPHILS 75 32 - 75 %    LYMPHOCYTES 16 12 - 49 %    MONOCYTES 7 5 - 13 %    EOSINOPHILS 1 0 - 7 %    BASOPHILS 0 0 - 1 %    IMMATURE GRANULOCYTES 1 (H) 0.0 - 0.5 %    ABS. NEUTROPHILS 14.0 (H) 1.8 - 8.0 K/UL    ABS. LYMPHOCYTES 2.9 0.8 - 3.5 K/UL    ABS. MONOCYTES 1.3 (H) 0.0 - 1.0 K/UL    ABS. EOSINOPHILS 0.2 0.0 - 0.4 K/UL    ABS. BASOPHILS 0.1 0.0 - 0.1 K/UL    ABS. IMM. GRANS. 0.2 (H) 0.00 - 0.04 K/UL    DF AUTOMATED     METABOLIC PANEL, COMPREHENSIVE    Collection Time: 11/17/18  5:54 PM   Result Value Ref Range    Sodium 140 136 - 145 mmol/L    Potassium 3.9 3.5 - 5.1 mmol/L    Chloride 101 97 - 108 mmol/L    CO2 28 21 - 32 mmol/L    Anion gap 11 5 - 15 mmol/L    Glucose 121 (H) 65 - 100 mg/dL    BUN 39 (H) 6 - 20 MG/DL    Creatinine 1.03 0.70 - 1.30 MG/DL    BUN/Creatinine ratio 38 (H) 12 - 20      GFR est AA >60 >60 ml/min/1.73m2    GFR est non-AA >60 >60 ml/min/1.73m2    Calcium 8.9 8.5 - 10.1 MG/DL    Bilirubin, total 0.4 0.2 - 1.0 MG/DL    ALT (SGPT) 36 12 - 78 U/L    AST (SGOT) 21 15 - 37 U/L    Alk.  phosphatase 51 45 - 117 U/L    Protein, total 7.4 6.4 - 8.2 g/dL    Albumin 3.8 3.5 - 5.0 g/dL    Globulin 3.6 2.0 - 4.0 g/dL    A-G Ratio 1.1 1.1 - 2.2 PROTHROMBIN TIME + INR    Collection Time: 11/17/18  5:54 PM   Result Value Ref Range    INR 1.1 0.9 - 1.1      Prothrombin time 10.9 9.0 - 11.1 sec   LIPASE    Collection Time: 11/17/18  5:54 PM   Result Value Ref Range    Lipase 84 73 - 393 U/L   MAGNESIUM    Collection Time: 11/17/18  5:54 PM   Result Value Ref Range    Magnesium 1.8 1.6 - 2.4 mg/dL   ETHYL ALCOHOL    Collection Time: 11/17/18  5:54 PM   Result Value Ref Range    ALCOHOL(ETHYL),SERUM <10 <10 MG/DL   LACTIC ACID    Collection Time: 11/17/18  5:56 PM   Result Value Ref Range    Lactic acid 2.2 (HH) 0.4 - 2.0 MMOL/L   TYPE & SCREEN    Collection Time: 11/17/18  6:06 PM   Result Value Ref Range    Crossmatch Expiration 11/20/2018     ABO/Rh(D) A POSITIVE     Antibody screen NEG    CBC W/O DIFF    Collection Time: 11/17/18  7:49 PM   Result Value Ref Range    WBC 20.0 (H) 4.1 - 11.1 K/uL    RBC 4.20 4. 10 - 5.70 M/uL    HGB 12.1 12.1 - 17.0 g/dL    HCT 36.7 36.6 - 50.3 %    MCV 87.4 80.0 - 99.0 FL    MCH 28.8 26.0 - 34.0 PG    MCHC 33.0 30.0 - 36.5 g/dL    RDW 12.7 11.5 - 14.5 %    PLATELET 551 330 - 646 K/uL    MPV 10.7 8.9 - 12.9 FL    NRBC 0.0 0  WBC    ABSOLUTE NRBC 0.00 0.00 - 0.01 K/uL   LACTIC ACID    Collection Time: 11/18/18  3:53 AM   Result Value Ref Range    Lactic acid 0.8 0.4 - 2.0 MMOL/L   METABOLIC PANEL, BASIC    Collection Time: 11/18/18  3:53 AM   Result Value Ref Range    Sodium 142 136 - 145 mmol/L    Potassium 3.7 3.5 - 5.1 mmol/L    Chloride 105 97 - 108 mmol/L    CO2 30 21 - 32 mmol/L    Anion gap 7 5 - 15 mmol/L    Glucose 118 (H) 65 - 100 mg/dL    BUN 34 (H) 6 - 20 MG/DL    Creatinine 0.85 0.70 - 1.30 MG/DL    BUN/Creatinine ratio 40 (H) 12 - 20      GFR est AA >60 >60 ml/min/1.73m2    GFR est non-AA >60 >60 ml/min/1.73m2    Calcium 8.1 (L) 8.5 - 10.1 MG/DL   CBC W/O DIFF    Collection Time: 11/18/18  3:53 AM   Result Value Ref Range    WBC 12.6 (H) 4.1 - 11.1 K/uL    RBC 3.87 (L) 4.10 - 5.70 M/uL    HGB 11.2 (L) 12.1 - 17.0 g/dL    HCT 33.5 (L) 36.6 - 50.3 %    MCV 86.6 80.0 - 99.0 FL    MCH 28.9 26.0 - 34.0 PG    MCHC 33.4 30.0 - 36.5 g/dL    RDW 12.7 11.5 - 14.5 %    PLATELET 954 300 - 071 K/uL    MPV 10.7 8.9 - 12.9 FL    NRBC 0.0 0  WBC    ABSOLUTE NRBC 0.00 0.00 - 0.01 K/uL   HEPATIC FUNCTION PANEL    Collection Time: 11/18/18  3:53 AM   Result Value Ref Range    Protein, total 6.4 6.4 - 8.2 g/dL    Albumin 3.3 (L) 3.5 - 5.0 g/dL    Globulin 3.1 2.0 - 4.0 g/dL    A-G Ratio 1.1 1.1 - 2.2      Bilirubin, total 0.4 0.2 - 1.0 MG/DL    Bilirubin, direct 0.1 0.0 - 0.2 MG/DL    Alk. phosphatase 44 (L) 45 - 117 U/L    AST (SGOT) 16 15 - 37 U/L    ALT (SGPT) 30 12 - 78 U/L   MAGNESIUM    Collection Time: 11/18/18  3:53 AM   Result Value Ref Range    Magnesium 1.9 1.6 - 2.4 mg/dL   PHOSPHORUS    Collection Time: 11/18/18  3:53 AM   Result Value Ref Range    Phosphorus 3.3 2.6 - 4.7 MG/DL   HGB & HCT    Collection Time: 11/18/18 11:23 AM   Result Value Ref Range    HGB 11.3 (L) 12.1 - 17.0 g/dL    HCT 33.5 (L) 36.6 - 50.3 %         Assessment/Plan:     Principal Problem:    Acute GI bleeding (11/17/2018)    Active Problems:    Metabolic acidosis (40/93/8212)      Leukocytosis (11/17/2018)      ETOH abuse (11/17/2018)         See above narrative for full detail.

## 2018-11-18 NOTE — PROGRESS NOTES
TRANSFER - IN REPORT: 1045- Verbal report received from Blake Hurt RN (name) on Jeri Giles  being received from ED(unit) for routine progression of care Report consisted of patients Situation, Background, Assessment and  
Recommendations(SBAR). Information from the following report(s) SBAR, Kardex and Recent Results was reviewed with the receiving nurse. Opportunity for questions and clarification was provided. Assessment completed upon patients arrival to unit and care assumed. ... Primary Nurse Letty Slade RN and Hesham Reinoso RN performed a dual skin assessment on this patient No impairment noted Louie score is 23 Admission assessment done. VSS. NO N/V noted. .  Family @ bedside. IV is on progress. RA. NSR. SZ pad are applied. Pt is expressing that he has something is sitting  in his throat. \"DID STAND\". . He passed. Can have food. Beltran Hernandez CIWA- 0. Beltran Hernandez Blood to Lab. Beltran Hernandez 1830- Blood to lab.

## 2018-11-18 NOTE — PROGRESS NOTES
Lazaro Argueta [facility] 79 
566 [facility], [address] [place] [address] 
[phone] Medical Progress Note NAME: Brendan Carmen :  1990 MRM:  [id number] Date/Time: 2018  10:27 AM 
 
  
Assessment and Plan: 1. Acute GI bleeding/ hematemesis/ melena: upper GI bleed due to heavy etoh abuse. Keep NPO. Monitor Hgb closely. Consult GI. Check H/H Q 8 hours. Transfuse as needed. Continue IVF, PPI gtt.   
  
2. Anemia. Likely secondary to acute GI blood loss. Monitor closely and transfuse as needed. 3.  Leukocytosis: due to GI bleed. Not septic. Will monitor  
  
4. ETOH abuse: drinks 8-12 beers daily. On CIWA, IV ativan prn, IV goody bag 
  
 
  
  
Subjective: Chief Complaint:  Follow up of pt who was admitted with GI bleed. Denies bleeding ROS: 
(bold if positive, if negative) Tolerating PT  Tolerating Diet Objective:  
 
Last 24hrs VS reviewed since prior progress note. Most recent are: 
 
Visit Vitals /88 Pulse 86 Temp 99.2 °F (37.3 °C) Resp 24 Ht 5' 10\" (1.778 m) Wt 86.2 kg (190 lb) SpO2 97% BMI 27.26 kg/m² SpO2 Readings from Last 6 Encounters:  
18 97% 14 97% Intake/Output Summary (Last 24 hours) at 2018 1027 Last data filed at 2018 Gross per 24 hour Intake  Output 1 ml Net -1 ml Physical Exam: 
 
Gen:  Well-developed, well-nourished, in no acute distress HEENT:  Pink conjunctivae, PERRL, hearing intact to voice, moist mucous membranes Neck:  Supple, without masses, thyroid non-tender Resp:  No accessory muscle use, clear breath sounds without wheezes rales or rhonchi 
Card:  No murmurs, normal S1, S2 without thrills, bruits or peripheral edema Abd:  Soft, non-tender, non-distended, normoactive bowel sounds are present, no palpable organomegaly and no detectable hernias Lymph:  No cervical or inguinal adenopathy Musc:  No cyanosis or clubbing Skin:  No rashes or ulcers, skin turgor is good Neuro:  Cranial nerves are grossly intact, no focal motor weakness, follows commands appropriately Psych:  Good insight, oriented to person, place and time, alert 
__________________________________________________________________ Medications Reviewed: (see below) Medications:  
 
Current Facility-Administered Medications Medication Dose Route Frequency  pantoprazole (PROTONIX) 40 mg in 0.9% sodium chloride (MBP/ADV) 50 mL  8 mg/hr IntraVENous CONTINUOUS  
 LORazepam (ATIVAN) injection 2 mg  2 mg IntraVENous Q1H PRN  
 LORazepam (ATIVAN) injection 4 mg  4 mg IntraVENous Q1H PRN  
 dextrose 5 % - 0.45% NaCl 5,575 mL with folic acid 1 mg, thiamine 100 mg, mvi, adult no. 4 with vit K 10 mL infusion   IntraVENous DAILY  0.9% sodium chloride infusion  50 mL/hr IntraVENous CONTINUOUS  
 sodium chloride (NS) flush 5-10 mL  5-10 mL IntraVENous Q8H  
 sodium chloride (NS) flush 5-10 mL  5-10 mL IntraVENous PRN  
 acetaminophen (TYLENOL) tablet 650 mg  650 mg Oral Q4H PRN  
 HYDROmorphone (PF) (DILAUDID) injection 0.5 mg  0.5 mg IntraVENous Q4H PRN  
 naloxone (NARCAN) injection 0.4 mg  0.4 mg IntraVENous PRN  
 diphenhydrAMINE (BENADRYL) injection 12.5 mg  12.5 mg IntraVENous Q4H PRN  
 ondansetron (ZOFRAN) injection 4 mg  4 mg IntraVENous Q6H PRN  
 docusate sodium (COLACE) capsule 100 mg  100 mg Oral BID No current outpatient medications on file. Lab Data Reviewed: (see below) Lab Review:  
 
Recent Labs 11/18/18 
0353 11/17/18 
1949 11/17/18 
1754 WBC 12.6* 20.0* 18.7* HGB 11.2* 12.1 13.9 HCT 33.5* 36.7 41.4  241 275 Recent Labs 11/18/18 
21  11/17/18 
1754  140  
K 3.7 3.9  101 CO2 30 28 * 121* BUN 34* 39* CREA 0.85 1.03  
CA 8.1* 8.9 MG 1.9 1.8 PHOS 3.3  --   
ALB 3.3* 3.8 TBILI 0.4 0.4 SGOT 16 21 ALT 30 36 INR  --  1.1 No results found for: Agustín Pritesh No results for input(s): PH, PCO2, PO2, HCO3, FIO2 in the last 72 hours. Recent Labs 11/17/18 
1754 INR 1.1 All Micro Results None I have reviewed notes of prior 24hr. Other pertinent lab: Total time spent with patient: 28 Care Plan discussed with: Patient, Family, Nursing Staff and >50% of time spent in counseling and coordination of care Discussed:  Care Plan Prophylaxis:  SCD's Disposition:  Home w/Family 
        
___________________________________________________ Attending Physician: Chitra Travis MD

## 2018-11-18 NOTE — ED NOTES
PT up to bathroom at this time, able to ambulate and transfer with no assistance. Pt got up and brushed teeth as well as used the bathroom. Pt reports no issues with this, stable in appearance. PTs girlfriend remains at the bedside. Jennie Lebron RN.

## 2018-11-18 NOTE — H&P
[address], [id number] [place] [address] 
[phone] Admission History and Physical 
 
 
NAME:  Mayito Valles :   1990 MRN:  [id number] PCP:  None  
 
Date/Time:  2018 Subjective: CHIEF COMPLAINT: gi bleeding HISTORY OF PRESENT ILLNESS:    
The patient is a 33 yo hx of heavy etoh abuse, presented w/ hematemesis, melena. The patient stated that he drinks about 8-12 beers daily for several years, but denied NSAIDs use. Today, he started to have black tarry stools, associated with hematemesis. He denied cough, fevers, chills, nausea, abd pain, diarrhea, syncope. In the ED, Hgb was 13.9, alcohol level normal.    
 
No Known Allergies Prior to Admission medications Medication Sig Start Date End Date Taking? Authorizing Provider HYDROcodone-acetaminophen (NORCO) 5-325 mg per tablet Take 1 tablet by mouth every four (4) hours as needed for Pain. 14   Priscilla Alicea DO Past Medical History:  
Diagnosis Date  Alcohol abuse No past surgical history on file. Social History Tobacco Use  Smoking status: Never Smoker Substance Use Topics  Alcohol use: Yes Drinks per session: 10 or more Binge frequency: Daily or almost daily Family History Problem Relation Age of Onset  Depression Father  Alcohol abuse Father Review of Systems: 
(bold if positive, if negative) Gen:  Eyes:  ENT:  CVS:  Pulm:  GI:   melena :   
MS:  Skin:  Psych:  Endo:   
Hem:  Renal:   
Neuro:    
 
  
Objective: VITALS:   
Vital signs reviewed; most recent are: 
 
Visit Vitals /76 Pulse (!) 120 Temp 98.3 °F (36.8 °C) Resp 26 Ht 5' 10\" (1.778 m) Wt 86.2 kg (190 lb) SpO2 100% BMI 27.26 kg/m² SpO2 Readings from Last 6 Encounters:  
18 100% 14 97% No intake or output data in the 24 hours ending 18 Exam:  
 
Physical Exam: Gen:  Disheveled, mild distress HEENT:  Pink conjunctivae, PERRL, hearing intact to voice, dry mucous membranes Neck:  Supple, without masses, thyroid non-tender Resp:  No accessory muscle use, clear breath sounds without wheezes rales or rhonchi 
Card:  No murmurs, normal S1, S2 without thrills, bruits or peripheral edema Abd:  Soft, non-tender, non-distended, normoactive bowel sounds are present Lymph:  No cervical adenopathy Musc:  No cyanosis or clubbing Skin:  No rashes or ulcers, skin turgor is good Neuro:  Cranial nerves 3-12 are grossly intact, follows commands appropriately Psych:  Alert with good insight. Oriented to person, place, and time Labs: 
 
Recent Labs 11/17/18 
1754 WBC 18.7* HGB 13.9 HCT 41.4  Recent Labs 11/17/18 
1754   
K 3.9  CO2 28 * BUN 39* CREA 1.03  
CA 8.9 MG 1.8 ALB 3.8 TBILI 0.4 SGOT 21 ALT 36 No results found for: Yoni Ser No results for input(s): PH, PCO2, PO2, HCO3, FIO2 in the last 72 hours. Recent Labs 11/17/18 
1754 INR 1.1 Radiology and EKG reviewed:   abd CT neg **Old Records reviewed in [address] [facility]** Assessment/Plan:   
  
Principal Problem: 
 
35 yo hx of heavy etoh abuse, presented w/ hematemesis, melena 1) Acute GI bleeding/hematemesis/melena: upper GI bleed due to heavy etoh abuse. Will keep NPO. Monitor Hgb closely, consent for blood transfusion. Also start IVF, PPI gtt. Place NGT if further vomiting. Consult GI 2) Metabolic acidosis/lactic acidosis: due to GI bleed, dehydration, etoh abuse. Cont IVF, monitor lactate 3) Leukocytosis: due to GI bleed. Not septic. Will monitor 4) ETOH abuse: drinks 8-12 beers daily. Will monitor on Tele. Start CIWA, IV ativan prn, IV goody bag 
 
Code: Full Risk of deterioration: high Total time spent with patient: 70 Minutes Care Plan discussed with: Patient, family, nursing Discussed:  Care Plan Prophylaxis:  SCD's 
 
Probable Disposition:  Home w/Family 
        
___________________________________________________ Attending Physician: Chetna Medina MD

## 2018-11-18 NOTE — PROGRESS NOTES
Chart reviewed and case discussed with the ER Attending. Suspect that he has esophagitis. His labs are not consistent with chronic liver disease, and his CT scan shows no changes consistent with chronic liver disease. Agree with IV PPI therapy and IV fluids. NPO for now. Full consult to follow tomorrow unless he has a change in his clinical status overnight.

## 2018-11-18 NOTE — ED NOTES
TRANSFER - OUT REPORT: 
 
Verbal report given to Nayla Cameron RN on Jeri Giles  being transferred to 3rd Floor for routine progression of care Report consisted of patients Situation, Background, Assessment and  
Recommendations(SBAR). Information from the following report(s) SBAR, Kardex, ED Summary, Intake/Output, MAR, Recent Results, Med Rec Status and Alarm Parameters  was reviewed with the receiving nurse. Lines:  
Peripheral IV 11/17/18 Right Antecubital (Active) Site Assessment Clean, dry, & intact 11/17/2018  5:57 PM  
Phlebitis Assessment 0 11/17/2018  5:57 PM  
Infiltration Assessment 0 11/17/2018  5:57 PM  
Dressing Status Clean, dry, & intact; New 11/17/2018  5:57 PM  
Dressing Type Transparent;Tape 11/17/2018  5:57 PM  
Hub Color/Line Status Green;Patent; Flushed 11/17/2018  5:57 PM  
Action Taken Blood drawn 11/17/2018  5:57 PM  
   
Peripheral IV 11/17/18 Left Hand (Active) Site Assessment Clean, dry, & intact 11/17/2018  8:15 PM  
Phlebitis Assessment 0 11/17/2018  8:15 PM  
Infiltration Assessment 0 11/17/2018  8:15 PM  
Dressing Status Clean, dry, & intact 11/17/2018  8:15 PM  
Dressing Type Transparent 11/17/2018  8:15 PM  
  
 
Opportunity for questions and clarification was provided. Patient transported with: 
 Registered Nurse

## 2018-11-18 NOTE — PROGRESS NOTES
Problem: Falls - Risk of 
Goal: *Absence of Falls Document Gregg Shadow Fall Risk and appropriate interventions in the flowsheet. Outcome: Progressing Towards Goal 
Fall Risk Interventions: 
  
 
  
 
Medication Interventions: Bed/chair exit alarm. . 
 
1500- Up in bathroom. . Had a little stool. Cathern Dunks stool noted, , NO fresh blood in stool. Justine Contreras diet. . EGD tomorrow. . Family @ bedside. 1900- Bedside and Verbal shift change report given to Prescilla Mortimer RN (oncoming nurse) by Jesús Borges (offgoing nurse). Report included the following information SBAR, Kardex and Recent Results.

## 2018-11-18 NOTE — ED NOTES
In room to start second IV, patient was unhooked from fluids by family member with no cap and end open to air. This RN requested to family member that the ED staff be the only ones to administer and disconnect IV fluids. Mother reports \"well he had to use the restroom. \" Family member requesting not to have IV placed in [facility], as patient will be admitted, advised that I will attempt to avoid the [facility] if possible. Family member states \"do you just want me to do it? \" Advised that since she is not an employee that is not allowed, states she works in endoscopy. Advised that she is not working and is not an ED employee, will not allow to start IV. IV placed in patient's left hand with no difficulty. Charge RN aware of situation.

## 2018-11-19 ENCOUNTER — ANESTHESIA (OUTPATIENT)
Dept: ENDOSCOPY | Age: 28
DRG: 369 | End: 2018-11-19
Payer: COMMERCIAL

## 2018-11-19 ENCOUNTER — ANESTHESIA EVENT (OUTPATIENT)
Dept: ENDOSCOPY | Age: 28
DRG: 369 | End: 2018-11-19
Payer: COMMERCIAL

## 2018-11-19 VITALS
WEIGHT: 202 LBS | HEIGHT: 70 IN | OXYGEN SATURATION: 100 % | TEMPERATURE: 98.8 F | HEART RATE: 76 BPM | RESPIRATION RATE: 17 BRPM | DIASTOLIC BLOOD PRESSURE: 81 MMHG | SYSTOLIC BLOOD PRESSURE: 130 MMHG | BODY MASS INDEX: 28.92 KG/M2

## 2018-11-19 PROBLEM — E87.20 METABOLIC ACIDOSIS: Status: RESOLVED | Noted: 2018-11-17 | Resolved: 2018-11-19

## 2018-11-19 PROBLEM — K92.2 ACUTE GI BLEEDING: Status: RESOLVED | Noted: 2018-11-17 | Resolved: 2018-11-19

## 2018-11-19 PROBLEM — D72.829 LEUKOCYTOSIS: Status: RESOLVED | Noted: 2018-11-17 | Resolved: 2018-11-19

## 2018-11-19 LAB
BASOPHILS # BLD: 0 K/UL (ref 0–0.1)
BASOPHILS NFR BLD: 0 % (ref 0–1)
DIFFERENTIAL METHOD BLD: ABNORMAL
EOSINOPHIL # BLD: 0.2 K/UL (ref 0–0.4)
EOSINOPHIL NFR BLD: 2 % (ref 0–7)
ERYTHROCYTE [DISTWIDTH] IN BLOOD BY AUTOMATED COUNT: 12.6 % (ref 11.5–14.5)
HCT VFR BLD AUTO: 28.9 % (ref 36.6–50.3)
HGB BLD-MCNC: 9.8 G/DL (ref 12.1–17)
IMM GRANULOCYTES # BLD: 0 K/UL (ref 0–0.04)
IMM GRANULOCYTES NFR BLD AUTO: 0 % (ref 0–0.5)
LYMPHOCYTES # BLD: 3.9 K/UL (ref 0.8–3.5)
LYMPHOCYTES NFR BLD: 41 % (ref 12–49)
MCH RBC QN AUTO: 29.3 PG (ref 26–34)
MCHC RBC AUTO-ENTMCNC: 33.9 G/DL (ref 30–36.5)
MCV RBC AUTO: 86.5 FL (ref 80–99)
MONOCYTES # BLD: 0.6 K/UL (ref 0–1)
MONOCYTES NFR BLD: 6 % (ref 5–13)
NEUTS SEG # BLD: 4.9 K/UL (ref 1.8–8)
NEUTS SEG NFR BLD: 51 % (ref 32–75)
NRBC # BLD: 0 K/UL (ref 0–0.01)
NRBC BLD-RTO: 0 PER 100 WBC
PLATELET # BLD AUTO: 191 K/UL (ref 150–400)
PMV BLD AUTO: 10.7 FL (ref 8.9–12.9)
RBC # BLD AUTO: 3.34 M/UL (ref 4.1–5.7)
WBC # BLD AUTO: 9.6 K/UL (ref 4.1–11.1)

## 2018-11-19 PROCEDURE — 36415 COLL VENOUS BLD VENIPUNCTURE: CPT

## 2018-11-19 PROCEDURE — 76060000031 HC ANESTHESIA FIRST 0.5 HR: Performed by: INTERNAL MEDICINE

## 2018-11-19 PROCEDURE — 74011250637 HC RX REV CODE- 250/637: Performed by: INTERNAL MEDICINE

## 2018-11-19 PROCEDURE — 74011250636 HC RX REV CODE- 250/636: Performed by: INTERNAL MEDICINE

## 2018-11-19 PROCEDURE — 74011000258 HC RX REV CODE- 258: Performed by: INTERNAL MEDICINE

## 2018-11-19 PROCEDURE — 85025 COMPLETE CBC W/AUTO DIFF WBC: CPT

## 2018-11-19 PROCEDURE — 0DJ08ZZ INSPECTION OF UPPER INTESTINAL TRACT, VIA NATURAL OR ARTIFICIAL OPENING ENDOSCOPIC: ICD-10-PCS | Performed by: INTERNAL MEDICINE

## 2018-11-19 PROCEDURE — 74011250636 HC RX REV CODE- 250/636

## 2018-11-19 PROCEDURE — 94760 N-INVAS EAR/PLS OXIMETRY 1: CPT

## 2018-11-19 PROCEDURE — 74011000258 HC RX REV CODE- 258

## 2018-11-19 PROCEDURE — C9113 INJ PANTOPRAZOLE SODIUM, VIA: HCPCS | Performed by: INTERNAL MEDICINE

## 2018-11-19 PROCEDURE — 76040000019: Performed by: INTERNAL MEDICINE

## 2018-11-19 RX ORDER — NALOXONE HYDROCHLORIDE 0.4 MG/ML
0.4 INJECTION, SOLUTION INTRAMUSCULAR; INTRAVENOUS; SUBCUTANEOUS
Status: DISCONTINUED | OUTPATIENT
Start: 2018-11-19 | End: 2018-11-19 | Stop reason: HOSPADM

## 2018-11-19 RX ORDER — FLUMAZENIL 0.1 MG/ML
0.2 INJECTION INTRAVENOUS
Status: DISCONTINUED | OUTPATIENT
Start: 2018-11-19 | End: 2018-11-19 | Stop reason: HOSPADM

## 2018-11-19 RX ORDER — MIDAZOLAM HYDROCHLORIDE 1 MG/ML
.25-5 INJECTION, SOLUTION INTRAMUSCULAR; INTRAVENOUS
Status: DISCONTINUED | OUTPATIENT
Start: 2018-11-19 | End: 2018-11-19 | Stop reason: HOSPADM

## 2018-11-19 RX ORDER — PROPOFOL 10 MG/ML
INJECTION, EMULSION INTRAVENOUS AS NEEDED
Status: DISCONTINUED | OUTPATIENT
Start: 2018-11-19 | End: 2018-11-19 | Stop reason: HOSPADM

## 2018-11-19 RX ORDER — DEXTROMETHORPHAN/PSEUDOEPHED 2.5-7.5/.8
1.2 DROPS ORAL
Status: DISCONTINUED | OUTPATIENT
Start: 2018-11-19 | End: 2018-11-19 | Stop reason: HOSPADM

## 2018-11-19 RX ORDER — EPINEPHRINE 0.1 MG/ML
1 INJECTION INTRACARDIAC; INTRAVENOUS
Status: DISCONTINUED | OUTPATIENT
Start: 2018-11-19 | End: 2018-11-19 | Stop reason: HOSPADM

## 2018-11-19 RX ORDER — OMEPRAZOLE 20 MG/1
20 CAPSULE, DELAYED RELEASE ORAL 2 TIMES DAILY
Qty: 60 CAP | Refills: 0 | Status: SHIPPED | OUTPATIENT
Start: 2018-11-19

## 2018-11-19 RX ORDER — ATROPINE SULFATE 0.1 MG/ML
0.5 INJECTION INTRAVENOUS
Status: DISCONTINUED | OUTPATIENT
Start: 2018-11-19 | End: 2018-11-19 | Stop reason: HOSPADM

## 2018-11-19 RX ORDER — GLYCOPYRROLATE 0.2 MG/ML
INJECTION INTRAMUSCULAR; INTRAVENOUS AS NEEDED
Status: DISCONTINUED | OUTPATIENT
Start: 2018-11-19 | End: 2018-11-19

## 2018-11-19 RX ORDER — FENTANYL CITRATE 50 UG/ML
100 INJECTION, SOLUTION INTRAMUSCULAR; INTRAVENOUS
Status: DISCONTINUED | OUTPATIENT
Start: 2018-11-19 | End: 2018-11-19 | Stop reason: HOSPADM

## 2018-11-19 RX ORDER — SODIUM CHLORIDE 9 MG/ML
50 INJECTION, SOLUTION INTRAVENOUS CONTINUOUS
Status: DISCONTINUED | OUTPATIENT
Start: 2018-11-19 | End: 2018-11-19 | Stop reason: HOSPADM

## 2018-11-19 RX ADMIN — PROPOFOL 60 MG: 10 INJECTION, EMULSION INTRAVENOUS at 09:23

## 2018-11-19 RX ADMIN — PROPOFOL 50 MG: 10 INJECTION, EMULSION INTRAVENOUS at 09:25

## 2018-11-19 RX ADMIN — PROPOFOL 140 MG: 10 INJECTION, EMULSION INTRAVENOUS at 09:21

## 2018-11-19 RX ADMIN — SODIUM CHLORIDE 50 ML/HR: 9 INJECTION, SOLUTION INTRAVENOUS at 08:33

## 2018-11-19 RX ADMIN — SODIUM CHLORIDE 8 MG/HR: 900 INJECTION, SOLUTION INTRAVENOUS at 04:00

## 2018-11-19 RX ADMIN — SODIUM CHLORIDE 100 ML/HR: 900 INJECTION, SOLUTION INTRAVENOUS at 03:11

## 2018-11-19 NOTE — DISCHARGE SUMMARY
Hospitalist Discharge Summary     Patient ID:    Lyubov Oshea  [id number]  46 y.o.  1990    Admit date: 11/17/2018    Discharge date and time: 11/19/2018    Admission Diagnoses: Acute GI bleeding    Chronic Diagnoses:    Problem List as of 11/19/2018 Date Reviewed: 11/17/2018          Codes Class Noted - Resolved    ETOH abuse ICD-10-CM: F10.10  ICD-9-CM: 305.00  11/17/2018 - Present        * (Principal) RESOLVED: Acute GI bleeding ICD-10-CM: K92.2  ICD-9-CM: 578.9  11/17/2018 - 11/19/2018        RESOLVED: Metabolic acidosis GTV-63-TRACY: E87.2  ICD-9-CM: 276.2  11/17/2018 - 11/19/2018        RESOLVED: Leukocytosis ICD-10-CM: V60.301  ICD-9-CM: 288.60  11/17/2018 - 11/19/2018              Discharge Medications:   Current Discharge Medication List      START taking these medications    Details   omeprazole (PRILOSEC) 20 mg capsule Take 1 Cap by mouth two (2) times a day. Qty: 60 Cap, Refills: 0             Follow up Care:    1. None in 1-2 weeks  2. Diet:  Regular Diet    Disposition:  Home. Advanced Directive:    Discharge Exam:  See today's note. CONSULTATIONS: GI    Significant Diagnostic Studies:   Recent Labs     11/19/18  0236 11/18/18  1833  11/18/18  0353   WBC 9.6  --   --  12.6*   HGB 9.8* 10.5*   < > 11.2*   HCT 28.9* 31.1*   < > 33.5*     --   --  213    < > = values in this interval not displayed. Recent Labs     11/18/18  0353 11/17/18  1754    140   K 3.7 3.9    101   CO2 30 28   BUN 34* 39*   CREA 0.85 1.03   * 121*   CA 8.1* 8.9   MG 1.9 1.8   PHOS 3.3  --      Recent Labs     11/18/18  0353 11/17/18  1754   SGOT 16 21   ALT 30 36   AP 44* 51   TBILI 0.4 0.4   TP 6.4 7.4   ALB 3.3* 3.8   GLOB 3.1 3.6   LPSE  --  84     Recent Labs     11/17/18  1754   INR 1.1   PTP 10.9      No results for input(s): FE, TIBC, PSAT, FERR in the last 72 hours. No results for input(s): PH, PCO2, PO2 in the last 72 hours.   No results for input(s): CPK, CKMB in the last 72 hours.    No lab exists for component: TROPONINI  No results found for: Martín 57:   1.  Acute GI bleeding/ hematemesis/ melena: upper GI bleed due to luna yuni tear. Hgb stable. GI consult appreciated. EGD showed: Luz Veronica associated with hiatal hernia. Recommended PPI BID. Advised to quit alcohol use. 2.  Anemia. Likely secondary to acute GI blood loss. Stable.      3.  Leukocytosis: due to GI bleed. Not septic. Resolved      4.  ETOH abuse: drinks 8-12 beers daily.  No withdrawal.         Discharged in improved condition     Signed:  Geovanny Palacios MD  11/19/2018  10:48 AM

## 2018-11-19 NOTE — PROCEDURES
Amparo Villeda M.D. 2018    Esophagogastroduodenoscopy (EGD) Procedure Note  Stephanie Quintana  : 1990  [facility] Medical Record Number: [id number]      Indications:    Hematemesis, Melena/hematochezia  Referring Physician:  None  Anesthesia/Sedation: Conscious Sedation/Moderate Sedation  Endoscopist:  Dr. Floridalma Villatoro; no surgical assistants  Permit:  The indications, risks, benefits and alternatives were reviewed with the patient or their decision maker who was provided an opportunity to ask questions and all questions were answered. The specific risks of esophagogastroduodenoscopy with conscious sedation were reviewed, including but not limited to anesthetic complication, bleeding, adverse drug reaction, missed lesion, infection, IV site reactions, and intestinal perforation which would lead to the need for surgical repair. Alternatives to EGD including radiographic imaging, observation without testing, or laboratory testing were reviewed as well as the limitations of those alternatives discussed. After considering the options and having all their questions answered, the patient or their decision maker provided both verbal and written consent to proceed. Procedure in Detail:  After obtaining informed consent, positioning of the patient in the left lateral decubitus position, and conduction of a pre-procedure pause or \"time out\" the endoscope was introduced into the mouth and advanced to the third portion duodenum. A careful inspection was made, and findings or interventions are described below.     Findings:   Esophagus:two vertical oriented linear tears without active bleeding  Stomach: hiatal hernia; submucosal hemorrhage fundus stomach without bleeding  Duodenum/jejunum: normal    Complications/estimated blood loss: none    Therapies:  none    Specimens: none Recommendations:  -Twice daily 20 mg omeprazole; advised permanent therapy  From my point of view can discharge  Follow up PCP as needed    Thank you for entrusting me with this patient's care. Please do not hesitate to contact me with any questions or if I can be of assistance with any of your other patients' GI needs.   Signed By: Glenis Mai MD                        November 19, 2018

## 2018-11-19 NOTE — PROGRESS NOTES
GI 
 
Has Abbey Crawford associated with hiatal hernia Recommend-Twice daily 20 mg omeprazole; advised permanent therapy From my point of view can discharge

## 2018-11-19 NOTE — PROGRESS NOTES
Discharge instructions, including information on new medication, were reviewed with patient. All questions were answered. IV and heart monitor were removed. Patient will be discharged home with his family. Patient was given a work excuse by Dr. Deng Figueroa.

## 2018-11-19 NOTE — PROGRESS NOTES
11- Reason for Admission:   Acute GI Bleeding RRAT Score: 4 Plan for utilizing home health:  No     
                 
Likelihood of Readmission:  Low Transition of Care Plan:  Home I met with the pt and his mother, Sonal Ren ([phone]), to determine potential discharge needs. He lives alone in a one story house attached to his mother's. He is independent with his ADL's, works full-time and drives. He has no PCP and I gave him a list of Erik Easley MD's. His mother has completed a Care Card application and will send it in when they receive his wages confirmation. I also spoke with him about his drinking and he did not feel it is a problem, he said he \"just likes beer\". I did give him a list of substance abuse programs. His mother will transport him home today. No discharge needs identified. Care Management Interventions PCP Verified by CM: Yes(No PCP-gave list of Erik Easley MD's) Transition of Care Consult (CM Consult): Discharge Planning Discharge Durable Medical Equipment: No 
Physical Therapy Consult: No 
Occupational Therapy Consult: No 
Speech Therapy Consult: No 
Current Support Network: Lives Alone, Own Home Confirm Follow Up Transport: Family Plan discussed with Pt/Family/Caregiver: Yes Discharge Location Discharge Placement: (Home) LIYA Bowie, CM

## 2018-11-19 NOTE — ANESTHESIA PREPROCEDURE EVALUATION
Anesthetic History No history of anesthetic complications Review of Systems / Medical History Patient summary reviewed and pertinent labs reviewed Pulmonary Smoker Neuro/Psych Within defined limits Cardiovascular Within defined limits Exercise tolerance: >4 METS 
  
GI/Hepatic/Renal 
  
 
 
 
 
 
Comments: GI bleed. Endo/Other Within defined limits Other Findings Comments: ETOH abuse Physical Exam 
 
Airway Mallampati: II 
TM Distance: 4 - 6 cm Neck ROM: normal range of motion Mouth opening: Normal 
 
 Cardiovascular Regular rate and rhythm,  S1 and S2 normal,  no murmur, click, rub, or gallop Rhythm: regular Rate: normal 
 
 
 
 Dental 
No notable dental hx Pulmonary Breath sounds clear to auscultation Abdominal 
GI exam deferred Other Findings Anesthetic Plan ASA: 2 Anesthesia type: MAC Induction: Intravenous Anesthetic plan and risks discussed with: Patient

## 2018-11-19 NOTE — PROGRESS NOTES
Shift Summary 1930: Bedside and Verbal shift change report given to Dieudonne Hua RN (oncoming nurse) by Christian Mata RN (offgoing nurse). Report included the following information SBAR, Kardex, Procedure Summary, Intake/Output, MAR, Accordion, Recent Results and Cardiac Rhythm NSR.  
 
 
0000:  Patient NPO after midnight, verbalizes understanding, fluids removed from bedside. 0250: labs sent down. 0530: Patient Eliz Phi stayed negative overnight, no signs of alcohol withdrawal.  
 
No bloody stools overnight 
 
0730: Bedside and Verbal shift change report given to Sara Le RN (oncoming nurse) by Dieudonne Hua RN (offgoing nurse). Report included the following information SBAR, Kardex, Procedure Summary, Intake/Output, MAR, Accordion, Recent Results and Cardiac Rhythm NSR. Care Plan Summary Problem: Falls - Risk of 
Goal: *Absence of Falls Document Rangel Kins Fall Risk and appropriate interventions in the flowsheet. Outcome: Progressing Towards Goal 
Fall Risk Interventions: 
  
Problem: Alcohol Withdrawal 
Goal: *STG: Participates in treatment plan Outcome: Not Progressing Towards Goal 
Denies potential for withdrawal

## 2018-11-19 NOTE — ANESTHESIA POSTPROCEDURE EVALUATION
Procedure(s): ESOPHAGOGASTRODUODENOSCOPY (EGD). Anesthesia Post Evaluation Multimodal analgesia: multimodal analgesia not used between 6 hours prior to anesthesia start to PACU discharge Patient location during evaluation: PACU Patient participation: complete - patient participated Level of consciousness: awake Pain management: adequate Airway patency: patent Anesthetic complications: no 
Cardiovascular status: acceptable, blood pressure returned to baseline and hemodynamically stable Respiratory status: acceptable Hydration status: acceptable Visit Vitals /78 Pulse 76 Temp 37.1 °C (98.8 °F) Resp 17 Ht 5' 10\" (1.778 m) Wt 91.6 kg (202 lb) SpO2 100% BMI 28.98 kg/m²

## 2018-11-19 NOTE — PROGRESS NOTES
Lazaro Argueta [facility] [place] [facility] 
[address] 
[phone] Medical Progress Note NAME: Kyle Saenz :  1990 MRM:  [id number] Date/Time: 2018  10:27 AM 
 
  
Assessment and Plan: 1. Acute GI bleeding/ hematemesis/ melena: upper GI bleed due to heavy etoh abuse/ gastritis/ esophagitis. Monitor Hgb closely. GI consult appreciated. Planned for EGD. Check H/H Q 8 hours. Transfuse as needed. Continue IVF, PPI gtt.   
  
2. Anemia. Likely secondary to acute GI blood loss. Monitor closely and transfuse as needed. 3.  Leukocytosis: due to GI bleed. Not septic. Will monitor  
  
4. ETOH abuse: drinks 8-12 beers daily. No withdrawal.  On CIWA, IV ativan prn, IV goody bag 
  
 
  
  
Subjective: Chief Complaint:  Follow up of pt who was admitted with GI bleed. Denies bleeding ROS: 
(bold if positive, if negative) Tolerating PT  Tolerating Diet Objective:  
 
Last 24hrs VS reviewed since prior progress note. Most recent are: 
 
Visit Vitals /65 (BP 1 Location: Right arm, BP Patient Position: At rest) Pulse 71 Temp 98.3 °F (36.8 °C) Resp 14 Ht 5' 10\" (1.778 m) Wt 91.6 kg (202 lb) SpO2 98% BMI 28.98 kg/m² SpO2 Readings from Last 6 Encounters:  
18 98% 14 97% Intake/Output Summary (Last 24 hours) at 2018 2588 Last data filed at 2018 3202 Gross per 24 hour Intake 6109.64 ml Output 552 ml Net 5557.64 ml Physical Exam: 
 
Gen:  Well-developed, well-nourished, in no acute distress HEENT:  Pink conjunctivae, PERRL, hearing intact to voice, moist mucous membranes Neck:  Supple, without masses, thyroid non-tender Resp:  No accessory muscle use, clear breath sounds without wheezes rales or rhonchi 
Card:  No murmurs, normal S1, S2 without thrills, bruits or peripheral edema Abd:  Soft, non-tender, non-distended, normoactive bowel sounds are present, no palpable organomegaly and no detectable hernias Lymph:  No cervical or inguinal adenopathy Musc:  No cyanosis or clubbing Skin:  No rashes or ulcers, skin turgor is good Neuro:  Cranial nerves are grossly intact, no focal motor weakness, follows commands appropriately Psych:  Good insight, oriented to person, place and time, alert 
__________________________________________________________________ Medications Reviewed: (see below) Medications:  
 
Current Facility-Administered Medications Medication Dose Route Frequency  pantoprazole (PROTONIX) 40 mg in 0.9% sodium chloride (MBP/ADV) 50 mL  8 mg/hr IntraVENous CONTINUOUS  
 LORazepam (ATIVAN) injection 2 mg  2 mg IntraVENous Q1H PRN  
 LORazepam (ATIVAN) injection 4 mg  4 mg IntraVENous Q1H PRN  
 dextrose 5 % - 0.45% NaCl 6,115 mL with folic acid 1 mg, thiamine 100 mg, mvi, adult no. 4 with vit K 10 mL infusion   IntraVENous DAILY  0.9% sodium chloride infusion  100 mL/hr IntraVENous CONTINUOUS  
 sodium chloride (NS) flush 5-10 mL  5-10 mL IntraVENous Q8H  
 sodium chloride (NS) flush 5-10 mL  5-10 mL IntraVENous PRN  
 acetaminophen (TYLENOL) tablet 650 mg  650 mg Oral Q4H PRN  
 naloxone (NARCAN) injection 0.4 mg  0.4 mg IntraVENous PRN  
 diphenhydrAMINE (BENADRYL) injection 12.5 mg  12.5 mg IntraVENous Q4H PRN  
 ondansetron (ZOFRAN) injection 4 mg  4 mg IntraVENous Q6H PRN  
 docusate sodium (COLACE) capsule 100 mg  100 mg Oral BID Lab Data Reviewed: (see below) Lab Review:  
 
Recent Labs 11/19/18 
0236 11/18/18 
1833 11/18/18 
1123 11/18/18 
0353 11/17/18 
1949 WBC 9.6  --   --  12.6* 20.0* HGB 9.8* 10.5* 11.3* 11.2* 12.1 HCT 28.9* 31.1* 33.5* 33.5* 36.7   --   --  213 241 Recent Labs 11/18/18 
21  11/17/18 
1754  140  
K 3.7 3.9  101 CO2 30 28 * 121* BUN 34* 39* CREA 0.85 1.03  
CA 8.1* 8.9 MG 1.9 1.8 PHOS 3.3  --   
ALB 3.3* 3.8 TBILI 0.4 0.4 SGOT 16 21 ALT 30 36 INR  --  1.1 No results found for: Silvia Mitchell No results for input(s): PH, PCO2, PO2, HCO3, FIO2 in the last 72 hours. Recent Labs 11/17/18 
1754 INR 1.1 All Micro Results None I have reviewed notes of prior 24hr. Other pertinent lab: Total time spent with patient: 28 Care Plan discussed with: Patient, Family, Nursing Staff and >50% of time spent in counseling and coordination of care Discussed:  Care Plan Prophylaxis:  SCD's Disposition:  Home w/Family 
        
___________________________________________________ Attending Physician: Florencia Vaughan MD

## 2018-11-19 NOTE — DISCHARGE INSTRUCTIONS
ACUTE DIAGNOSES:  Acute GI bleeding    CHRONIC MEDICAL DIAGNOSES:  Problem List as of 11/19/2018 Date Reviewed: 11/17/2018          Codes Class Noted - Resolved    ETOH abuse ICD-10-CM: F10.10  ICD-9-CM: 305.00  11/17/2018 - Present        * (Principal) RESOLVED: Acute GI bleeding ICD-10-CM: K92.2  ICD-9-CM: 578.9  11/17/2018 - 11/19/2018        RESOLVED: Metabolic acidosis [facility]: E87.2  ICD-9-CM: 276.2  11/17/2018 - 11/19/2018        RESOLVED: Leukocytosis ICD-10-CM: R02.069  ICD-9-CM: 288.60  11/17/2018 - 11/19/2018              DISCHARGE MEDICATIONS:          · It is important that you take the medication exactly as they are prescribed. · Keep your medication in the bottles provided by the pharmacist and keep a list of the medication names, dosages, and times to be taken in your wallet. · Do not take other medications without consulting your doctor. DIET:  Regular Diet    ACTIVITY: Activity as tolerated    ADDITIONAL INFORMATION: If you experience any of the following symptoms then please call your primary care physician or return to the emergency room if you cannot get hold of your doctor: Fever, chills, nausea, vomiting, diarrhea, change in mentation, falling, bleeding, shortness of breath. FOLLOW UP CARE:  Dr. Zendejas  you are to call and set up an appointment to see them in 2 weeks. Information obtained by :  I understand that if any problems occur once I am at home I am to contact my physician. I understand and acknowledge receipt of the instructions indicated above.                                                                                                                                            Physician's or R.N.'s Signature                                                                  Date/Time                                                                                                                                              Patient or Representative Signature Date/Time

## 2018-11-19 NOTE — PROGRESS NOTES
Bedside and Verbal shift change report given to Azra Childs (oncoming nurse) by [facility] (offgoing nurse). Report included the following information SBAR, Kardex, Intake/Output, Accordion and Recent Results.

## 2018-11-19 NOTE — PERIOP NOTES
Report from Selena Chacko, see anesthesia record. ABD remains soft and non-tender post procedure. Pt has no complaints at this time and tolerated the procedure well. Endoscope was pre-cleaned at bedside immediately following procedure by Tung Ledesma.

## 2018-11-19 NOTE — PROGRESS NOTES
Pharmacist Discharge Medication Reconciliation Discharge Provider:  Dr. Leah Zeng Discharge Medications: My Medications START taking these medications Instructions Each Dose to Equal Morning Noon Evening Bedtime  
omeprazole 20 mg capsule Commonly known as:  PRILOSEC Your last dose was: Your next dose is: Take 1 Cap by mouth two (2) times a day. 20 mg Where to Get Your Medications Information on where to get these meds will be given to you by the nurse or doctor. Ask your nurse or doctor about these medications · omeprazole 20 mg capsule The patient's chart, MAR, and AVS were reviewed by 
 Malou Vanessa, PHARMD, Contact: [phone]

## 2018-11-19 NOTE — PERIOP NOTES
Kerry Marquez 1990 
[id number] Situation: 
 
Scheduled Procedure: Procedure(s): ESOPHAGOGASTRODUODENOSCOPY (EGD) Verbal report received from: [address], RN 
Preoperative diagnosis: GI bleed Background: 
 
Procedure: Procedure(s): ESOPHAGOGASTRODUODENOSCOPY (EGD) Physician performing procedure; Dr. Ledesma Has SBAR QUESTIONS FLOOR TO ENDO RN 
 
NPO Status/Last PO Intake: midnight Pregnancy Test:Not applicable If yes, result: none Is the patient taking Blood Thinners: NO Is the patient diabetic:no If yes, what was the last BS:    Time taken? Anything given? no          
Does the patient have a Pacemaker/Defibrillator in place?: no  
Does the patient need antibiotics before/during/after procedure: no If the patient is having a colon, How much prep was drank? What were the Colon prep results? Does the patient have SCD in place:no Is patient on CONTACT precautions:no Assessment: 
Are the vital signs stable prior to patient coming to ENDO?  yes Is the patient alert/oriented and able to sign consent for the procedures:yes How does the patient's abdomen feel prior to coming to ENDO? round and soft n/a Does the patient have a patient IV in place? yes Recommendation: 
Family or Friend present yes Permission to share finding with Family or Friend yes EGD @ 1030 today

## 2018-11-19 NOTE — PERIOP NOTES
TRANSFER - OUT REPORT: 
 
Verbal report given to Suleman Orellana RN (name) on Mayito Valles  being transferred to [facility] (unit) for ordered procedure Report consisted of patients Situation, Background, Assessment and  
Recommendations(SBAR). Information from the following report(s) Procedure Summary and Recent Results was reviewed with the receiving nurse. Lines:  
Peripheral IV 11/17/18 Right Antecubital (Active) Site Assessment Clean, dry, & intact 11/19/2018  9:04 AM  
Phlebitis Assessment 0 11/19/2018  9:04 AM  
Infiltration Assessment 0 11/19/2018  9:04 AM  
Dressing Status Clean, dry, & intact 11/19/2018  9:04 AM  
Dressing Type Tape;Transparent 11/19/2018  9:04 AM  
Hub Color/Line Status Green 11/19/2018  9:04 AM  
Action Taken Open ports on tubing capped 11/18/2018  7:59 PM  
Alcohol Cap Used Yes 11/19/2018  9:04 AM  
   
Peripheral IV 11/17/18 Left Hand (Active) Site Assessment Clean, dry, & intact 11/19/2018  3:23 AM  
Phlebitis Assessment 0 11/19/2018  3:23 AM  
Infiltration Assessment 0 11/19/2018  3:23 AM  
Dressing Status Clean, dry, & intact 11/19/2018  3:23 AM  
Dressing Type Transparent;Tape 11/19/2018  3:23 AM  
Hub Color/Line Status Pink;Patent; Flushed; Infusing 11/19/2018  3:23 AM  
Action Taken Open ports on tubing capped 11/18/2018  7:59 PM  
Alcohol Cap Used Yes 11/19/2018  3:23 AM  
  
 
Opportunity for questions and clarification was provided. Patient transported with: 
 [employer]

## 2018-11-19 NOTE — PROGRESS NOTES
[address] November 19, 2018 RE: Ivan Leggett To Whom It May Concern, This is to certify that Ivan Leggett was hospitalized at [address] [place] [address] from 11/17/2018 to 11/19/2018 and may may return to work on 11/22/18. Please feel free to contact my office if you have any questions or concerns. Thank you for your assistance in this matter. Sincerely, Perla Shannon MD 
[phone]

## 2018-11-19 NOTE — PROGRESS NOTES
John Massimo 1990 
[id number] Situation: 
Verbal report received from:   Oneal Cunningham Procedure: Procedure(s): ESOPHAGOGASTRODUODENOSCOPY (EGD) Background: 
 
Preoperative diagnosis: GI bleed Postoperative diagnosis: Everrett Puffer Tear Hiatal Hernia :  Dr. Rosalina Gaona Assistant(s): Endoscopy Technician-1: Wendy Hanley Endoscopy RN-1: Flor Breaux RN Specimens: * No specimens in log * H. Pylori  no Assessment: 
Intra-procedure medications Anesthesia gave intra-procedure sedation and medications, see anesthesia flow sheet yes Intravenous fluids: NS@ [facility] Vital signs stable   yes Abdominal assessment: round and soft   Yes Recommendation: 
Discharge patient per MD order  inpatient. Return to floor yes Family or Friend   Mom Permission to share finding with family or friend yes

## 2018-11-19 NOTE — PERIOP NOTES
Report from Brett Díaz RN. Pt arrived to endoscopy with tele box # [id number]. Placed in standby mode while on tele in our department.

## (undated) DEVICE — KIT COLON W/ 1.1OZ LUB AND 2 END

## (undated) DEVICE — KENDALL RADIOLUCENT FOAM MONITORING ELECTRODE -RECTANGULAR SHAPE: Brand: KENDALL

## (undated) DEVICE — BAG BELONG PT PERS CLEAR HANDL

## (undated) DEVICE — SOLIDIFIER MEDC 1200ML -- CONVERT TO 356117

## (undated) DEVICE — CANNULA CUSH AD W/ 14FT TBG

## (undated) DEVICE — 3M™ CUROS™ DISINFECTING CAP FOR NEEDLELESS CONNECTORS 270/CARTON 20 CARTONS/CASE CFF1-270: Brand: CUROS™

## (undated) DEVICE — BITEBLOCK ENDOSCP 60FR MAXI WHT POLYETH STURDY W/ VELC WVN

## (undated) DEVICE — SET GRAV CK VLV NEEDLESS ST 3 GANGED 4WAY STPCOCK HI FLO 10

## (undated) DEVICE — CANN NASAL O2 CAPNOGRAPHY AD -- FILTERLINE

## (undated) DEVICE — Device

## (undated) DEVICE — ADULT SPO2 SENSOR: Brand: NELLCOR

## (undated) DEVICE — 1200 GUARD II KIT W/5MM TUBE W/O VAC TUBE: Brand: GUARDIAN